# Patient Record
Sex: FEMALE | Race: BLACK OR AFRICAN AMERICAN | NOT HISPANIC OR LATINO | URBAN - METROPOLITAN AREA
[De-identification: names, ages, dates, MRNs, and addresses within clinical notes are randomized per-mention and may not be internally consistent; named-entity substitution may affect disease eponyms.]

---

## 2020-05-23 ENCOUNTER — OUTPATIENT (OUTPATIENT)
Dept: INPATIENT UNIT | Facility: HOSPITAL | Age: 35
LOS: 1 days | Discharge: HOME | End: 2020-05-23
Payer: MEDICAID

## 2020-05-23 ENCOUNTER — EMERGENCY (EMERGENCY)
Facility: HOSPITAL | Age: 35
LOS: 1 days | Discharge: STILL ADMITTED OR ADMINISTRATIVE | End: 2020-05-23
Admitting: EMERGENCY MEDICINE

## 2020-05-23 VITALS
SYSTOLIC BLOOD PRESSURE: 131 MMHG | TEMPERATURE: 99 F | OXYGEN SATURATION: 96 % | HEART RATE: 98 BPM | DIASTOLIC BLOOD PRESSURE: 74 MMHG | RESPIRATION RATE: 18 BRPM

## 2020-05-23 VITALS
SYSTOLIC BLOOD PRESSURE: 129 MMHG | DIASTOLIC BLOOD PRESSURE: 76 MMHG | TEMPERATURE: 98 F | OXYGEN SATURATION: 100 % | RESPIRATION RATE: 14 BRPM | HEART RATE: 96 BPM

## 2020-05-23 VITALS
HEART RATE: 98 BPM | TEMPERATURE: 99 F | RESPIRATION RATE: 16 BRPM | DIASTOLIC BLOOD PRESSURE: 73 MMHG | SYSTOLIC BLOOD PRESSURE: 123 MMHG

## 2020-05-23 DIAGNOSIS — Z3A.00 WEEKS OF GESTATION OF PREGNANCY NOT SPECIFIED: ICD-10-CM

## 2020-05-23 DIAGNOSIS — O26.899 OTHER SPECIFIED PREGNANCY RELATED CONDITIONS, UNSPECIFIED TRIMESTER: ICD-10-CM

## 2020-05-23 DIAGNOSIS — O30.049 TWIN PREGNANCY, DICHORIONIC/DIAMNIOTIC, UNSPECIFIED TRIMESTER: ICD-10-CM

## 2020-05-23 DIAGNOSIS — O42.90 PREMATURE RUPTURE OF MEMBRANES, UNSPECIFIED AS TO LENGTH OF TIME BETWEEN RUPTURE AND ONSET OF LABOR, UNSPECIFIED WEEKS OF GESTATION: ICD-10-CM

## 2020-05-23 LAB
ALBUMIN SERPL ELPH-MCNC: 3.4 G/DL — SIGNIFICANT CHANGE UP (ref 3.3–5)
ALP SERPL-CCNC: 61 U/L — SIGNIFICANT CHANGE UP (ref 40–120)
ALT FLD-CCNC: 12 U/L — SIGNIFICANT CHANGE UP (ref 4–33)
ANION GAP SERPL CALC-SCNC: 12 MMO/L — SIGNIFICANT CHANGE UP (ref 7–14)
APTT BLD: 27.3 SEC — LOW (ref 27.5–36.3)
AST SERPL-CCNC: 12 U/L — SIGNIFICANT CHANGE UP (ref 4–32)
BASOPHILS # BLD AUTO: 0.03 K/UL — SIGNIFICANT CHANGE UP (ref 0–0.2)
BASOPHILS NFR BLD AUTO: 0.2 % — SIGNIFICANT CHANGE UP (ref 0–2)
BILIRUB SERPL-MCNC: < 0.2 MG/DL — LOW (ref 0.2–1.2)
BLD GP AB SCN SERPL QL: NEGATIVE — SIGNIFICANT CHANGE UP
BUN SERPL-MCNC: 6 MG/DL — LOW (ref 7–23)
CALCIUM SERPL-MCNC: 9.3 MG/DL — SIGNIFICANT CHANGE UP (ref 8.4–10.5)
CHLORIDE SERPL-SCNC: 105 MMOL/L — SIGNIFICANT CHANGE UP (ref 98–107)
CO2 SERPL-SCNC: 18 MMOL/L — LOW (ref 22–31)
CREAT SERPL-MCNC: 0.49 MG/DL — LOW (ref 0.5–1.3)
EOSINOPHIL # BLD AUTO: 0.15 K/UL — SIGNIFICANT CHANGE UP (ref 0–0.5)
EOSINOPHIL NFR BLD AUTO: 1.2 % — SIGNIFICANT CHANGE UP (ref 0–6)
FIBRINOGEN PPP-MCNC: 620 MG/DL — HIGH (ref 300–520)
GLUCOSE SERPL-MCNC: 159 MG/DL — HIGH (ref 70–99)
HCT VFR BLD CALC: 31.5 % — LOW (ref 34.5–45)
HGB BLD-MCNC: 11 G/DL — LOW (ref 11.5–15.5)
IMM GRANULOCYTES NFR BLD AUTO: 0.5 % — SIGNIFICANT CHANGE UP (ref 0–1.5)
INR BLD: 1.06 — SIGNIFICANT CHANGE UP (ref 0.88–1.17)
LACTATE SERPL-SCNC: 0.9 MMOL/L — SIGNIFICANT CHANGE UP (ref 0.5–2)
LYMPHOCYTES # BLD AUTO: 19.4 % — SIGNIFICANT CHANGE UP (ref 13–44)
LYMPHOCYTES # BLD AUTO: 2.47 K/UL — SIGNIFICANT CHANGE UP (ref 1–3.3)
MCHC RBC-ENTMCNC: 32.5 PG — SIGNIFICANT CHANGE UP (ref 27–34)
MCHC RBC-ENTMCNC: 34.9 % — SIGNIFICANT CHANGE UP (ref 32–36)
MCV RBC AUTO: 93.2 FL — SIGNIFICANT CHANGE UP (ref 80–100)
MONOCYTES # BLD AUTO: 0.55 K/UL — SIGNIFICANT CHANGE UP (ref 0–0.9)
MONOCYTES NFR BLD AUTO: 4.3 % — SIGNIFICANT CHANGE UP (ref 2–14)
NEUTROPHILS # BLD AUTO: 9.44 K/UL — HIGH (ref 1.8–7.4)
NEUTROPHILS NFR BLD AUTO: 74.4 % — SIGNIFICANT CHANGE UP (ref 43–77)
NRBC # FLD: 0 K/UL — SIGNIFICANT CHANGE UP (ref 0–0)
PLATELET # BLD AUTO: 303 K/UL — SIGNIFICANT CHANGE UP (ref 150–400)
PMV BLD: 9.6 FL — SIGNIFICANT CHANGE UP (ref 7–13)
POTASSIUM SERPL-MCNC: 4 MMOL/L — SIGNIFICANT CHANGE UP (ref 3.5–5.3)
POTASSIUM SERPL-SCNC: 4 MMOL/L — SIGNIFICANT CHANGE UP (ref 3.5–5.3)
PROT SERPL-MCNC: 7 G/DL — SIGNIFICANT CHANGE UP (ref 6–8.3)
PROTHROM AB SERPL-ACNC: 12.1 SEC — SIGNIFICANT CHANGE UP (ref 9.8–13.1)
RBC # BLD: 3.38 M/UL — LOW (ref 3.8–5.2)
RBC # FLD: 12.5 % — SIGNIFICANT CHANGE UP (ref 10.3–14.5)
RH IG SCN BLD-IMP: POSITIVE — SIGNIFICANT CHANGE UP
RH IG SCN BLD-IMP: POSITIVE — SIGNIFICANT CHANGE UP
SARS-COV-2 RNA SPEC QL NAA+PROBE: SIGNIFICANT CHANGE UP
SODIUM SERPL-SCNC: 135 MMOL/L — SIGNIFICANT CHANGE UP (ref 135–145)
WBC # BLD: 12.7 K/UL — HIGH (ref 3.8–10.5)
WBC # FLD AUTO: 12.7 K/UL — HIGH (ref 3.8–10.5)

## 2020-05-23 PROCEDURE — 88305 TISSUE EXAM BY PATHOLOGIST: CPT | Mod: 26

## 2020-05-23 RX ORDER — SODIUM CHLORIDE 9 MG/ML
1000 INJECTION, SOLUTION INTRAVENOUS
Refills: 0 | Status: DISCONTINUED | OUTPATIENT
Start: 2020-05-23 | End: 2020-05-23

## 2020-05-23 RX ORDER — CITRIC ACID/SODIUM CITRATE 300-500 MG
30 SOLUTION, ORAL ORAL ONCE
Refills: 0 | Status: DISCONTINUED | OUTPATIENT
Start: 2020-05-23 | End: 2020-05-23

## 2020-05-23 RX ORDER — ONDANSETRON 8 MG/1
4 TABLET, FILM COATED ORAL ONCE
Refills: 0 | Status: DISCONTINUED | OUTPATIENT
Start: 2020-05-23 | End: 2020-05-23

## 2020-05-23 RX ORDER — FAMOTIDINE 10 MG/ML
20 INJECTION INTRAVENOUS ONCE
Refills: 0 | Status: DISCONTINUED | OUTPATIENT
Start: 2020-05-23 | End: 2020-05-23

## 2020-05-23 RX ORDER — HYDROMORPHONE HYDROCHLORIDE 2 MG/ML
0.5 INJECTION INTRAMUSCULAR; INTRAVENOUS; SUBCUTANEOUS
Refills: 0 | Status: DISCONTINUED | OUTPATIENT
Start: 2020-05-23 | End: 2020-05-23

## 2020-05-23 RX ORDER — METOCLOPRAMIDE HCL 10 MG
10 TABLET ORAL ONCE
Refills: 0 | Status: DISCONTINUED | OUTPATIENT
Start: 2020-05-23 | End: 2020-05-23

## 2020-05-23 RX ORDER — SODIUM CHLORIDE 9 MG/ML
3 INJECTION INTRAMUSCULAR; INTRAVENOUS; SUBCUTANEOUS EVERY 8 HOURS
Refills: 0 | Status: DISCONTINUED | OUTPATIENT
Start: 2020-05-23 | End: 2020-05-23

## 2020-05-23 NOTE — BRIEF OPERATIVE NOTE - NSICDXBRIEFPROCEDURE_GEN_ALL_CORE_FT
PROCEDURES:  Dilation and evacuation, uterus, using suction curettage 23-May-2020 18:47:29  Elizabeth Miller

## 2020-05-23 NOTE — CHART NOTE - NSCHARTNOTEFT_GEN_A_CORE
Pt seen and evaluated at bedside. Spoke with NP Sylvester who examined the pt and diagnosed trevor TIUP PPROM at 14w based on + pooling + fern, + Nitrazine with cervix 2 cm dilated on SVE.    Spoke with pt regarding history, reported in 2019 she had  a trevor TIUP at 17 weeks PPROM where she was found to be ruptured and chose expectant management. Pt states she went home for 2 days and then developed PTL, chorio and sepsis requiring extended stay with IV abx.    Counseled pt that although FHR is present and only 1 membrane is ruptured, induction of labor or D&E is recommended. Spoke with pt that this pregnancy is not viable due to ROM at 14 weeks and risk of development of chorio, sepsis and death. Pt understands she has option for expectant managements with close observation of her symptoms at home, induction of labor or D&E.  Pt reports she understands the dangers of expectant management and does not wish to have IOL due to previous experience. She is         incomplete Pt seen and evaluated at bedside. Spoke with NP Sylvester who examined the pt and diagnosed trevor TIUP PPROM at 14w based on + pooling + fern, + Nitrazine with cervix 2 cm dilated on SVE.    Spoke with pt regarding history, reported in 2019 she had  a trevor TIUP at 17 weeks PPROM where she was found to be ruptured and chose expectant management. Pt states she went home for 2 days and then developed PTL, chorio and sepsis requiring extended stay with IV abx.    Counseled pt that although FHR is present and only 1 membrane is ruptured, induction of labor or D&E is recommended. Spoke with pt that this pregnancy is not viable due to ROM at 14 weeks and risk of development of chorio, sepsis and death. Pt understands she has option for expectant managements with close observation of her symptoms at home, induction of labor or D&E.  Pt reports she understands the dangers of expectant management and does not wish to have IOL due to previous experience. Pt counseled about risks/benefits of D&E including uterine perforation, infection, bleeding, injury to surrounding structures. Pt understands and wishes to proceed with D&E.    36 yo  @ 14+2 presenting with trevor TIUP PPROM  - non viable pregnancy  - for D&E today  -COVID collected and pending   -NPO since 8AM    TLal PGY3  dw Dr Tran

## 2020-05-23 NOTE — OB PROVIDER H&P - NSHPPHYSICALEXAM_GEN_ALL_CORE
NAD  A/O x 3  Vital Signs Last 24 Hrs  T(C): 37.1 (23 May 2020 09:20), Max: 37.2 (23 May 2020 08:47)  T(F): 98.8 (23 May 2020 09:20), Max: 98.9 (23 May 2020 08:47)  HR: 86 (23 May 2020 10:46) (86 - 111)  BP: 122/75 (23 May 2020 10:46) (121/78 - 131/74)  BP(mean): --  RR: 16 (23 May 2020 09:20) (16 - 18)  SpO2: 96% (23 May 2020 08:47) (96% - 96%)  Abdomen is soft NT and gravid   SSE: Pooling clear fluid in the vaginal vault, Nitrazine  positive, ferning positive and cervix appears closed  SVE: 2 cm   Transabdominal ultrasound performed at the bedside , images printed and placed in patient shadow chart:  Appears to be DI DI TIUP with Lamada sign  visualized  Fetus A, FHR at approx 150 bmp and anydraminous, no visual movements  Fetus B: FHR at approx. 145 bpm with Good FM"s observed and AAFV at 3.5  TOCO: NO CTX

## 2020-05-23 NOTE — OB PROVIDER TRIAGE NOTE - NSHPLABSRESULTS_GEN_ALL_CORE
CBC, CMP, Coags with lactate and Type and Screen, Covid, Urine Culture sent CBC, CMP, Coags with lactate and Type and Screen, Covid, Urine Culture sent    05-23    135  |  105  |  6<L>  ----------------------------<  159<H>  4.0   |  18<L>  |  0.49<L>    Ca    9.3      23 May 2020 10:08    TPro  7.0  /  Alb  3.4  /  TBili  < 0.2<L>  /  DBili  x   /  AST  12  /  ALT  12  /  AlkPhos  61  05-23    PT/INR - ( 23 May 2020 10:08 )   PT: 12.1 SEC;   INR: 1.06          PTT - ( 23 May 2020 10:08 )  PTT:27.3 SEC  Fibrinogen Assay (05.23.20 @ 10:08)    Fibrinogen Assay: 620.0 mg/dL  Lactate, Blood: 0.9 mmol/L (05.23.20 @ 10:08)

## 2020-05-23 NOTE — OB PROVIDER H&P - CURRENT PREGNANCY COMPLICATIONS, OB PROFILE
Other/Gestational Age less than 36 Weeks/ Premature Rupture of Membranes (PPROM)/ [place]

## 2020-05-23 NOTE — OB PROVIDER TRIAGE NOTE - NS_OBGYNHISTORY_OBGYN_ALL_OB_FT
Vaginal delivery  2019 17 weeks   PPROM TIUP  Vaginal delivery  7/13/14 Ft 6-2  Vaginal delivery  12/12/07 FT 5-9  Vaginal delivery  2/14/03 FT 8-0

## 2020-05-23 NOTE — ASU DISCHARGE PLAN (ADULT/PEDIATRIC) - ACTIVITY LEVEL
No douching/Nothing per vagina/No tub baths/No intercourse/No tampons No heavy lifting/Nothing per vagina/No tub baths/No tampons/No excercise/No douching/No sports/gym/No intercourse/for 2 weeks

## 2020-05-23 NOTE — OB PROVIDER TRIAGE NOTE - NSOBPROVIDERNOTE_OBGYN_ALL_OB_FT
36 yo @ 14.2 wks GA with PPROM/ TIUP    - A/P DW Dr Grey ( OB Service Attending) and Dr Locke  (MFM Attending)  - Options counseling to be performed by OB Resident Dr gill (PG 3) and plan to bne determined   - Labs pending  -TOCO 36 yo @ 14.2 wks GA with PPROM/ TIUP    - A/P DW Dr Grey ( OB Service Attending) and Dr Locke  (MFM Attending)  - Options counseling to be performed by OB Resident Dr gill (PG 3) and plan to bne determined   - Labs pending  -TOCO    @ 1600   Awaiting MFM consult 34 yo @ 14.2 wks GA with PPROM/ TIUP    - A/P DW Dr Grey ( OB Service Attending) and Dr Locke  (MFM Attending)  - Options counseling to be performed by OB Resident Dr peralta (PG 3) and plan to be determined   - Labs pending  -TOCO    @ 1307   Awaiting [facility] consult    @ [id number] Consult by Dr Trudy Peralta and [facility] Attending Dr Tran  Options DW  patient including risks, benefits and patient opts for D/ E if possible

## 2020-05-23 NOTE — OB PROVIDER H&P - ASSESSMENT
34 yo @ 14.2  TIUP with PPROM for DE after options counseling dw OB and MFM Attending  Expedited COVID for DE 34 yo @ 14.2 wks GA with PPROM/ TIUP    - A/P DW Dr Grey ( OB Service Attending) and Dr Locke  (MFM Attending)  - Options counseling to be performed by OB Resident Dr peralta (PG 3) and plan to be determined   - Labs pending  -TOCO    @ 1307   Awaiting [facility] consult    @ [id number] Consult by Dr Trudy Peralta and [facility] Attending Dr Tran  Options DW  patient including risks, benefits and patient opts for D/ E if possible      Expedited COVID for DE

## 2020-05-23 NOTE — ASU DISCHARGE PLAN (ADULT/PEDIATRIC) - PROVIDER TOKENS
FREE:[LAST:[[facility]],PHONE:[phone]],FAX:[(   [phone]    -],ADDRESS:[[facility], Oncology [facility], [facility]]]

## 2020-05-23 NOTE — ED ADULT TRIAGE NOTE - CHIEF COMPLAINT QUOTE
LMP 2/1/2020 BUZZ 11/09/2020  Presents with c/o abdominal pain with fluid leaking.  Pt endorses being pregnant with twins.  Was evaluated at [facility] yesterday and told baby A had less fluid.  Pt's OB/GYN not affiliated with this hospital.

## 2020-05-23 NOTE — ED ADULT NURSE NOTE - CHIEF COMPLAINT QUOTE
LMP 2/1/2020 BUZZ 11/09/2020  Presents with c/o abdominal pain with fluid leaking.  Pt endorses being pregnant with twins.  Was evaluated at [facility] yesterday and told baby A had less fluid.  Pt's OB/GYN not affiliated with this hospital.

## 2020-05-23 NOTE — OB PROVIDER H&P - ATTENDING COMMENTS
I saw and evaluated Ms. Oviedo.   She is a 35 year old  at 14 weeks, 2 days gestational age with dichorionic twin pregnancy who presented this morning with leakage of clear fluid for a few days. She has been evaluated by her primary OB who told her there was little fluid around twin A and treated her for UTI with PO antibiotics and instructed for follow up with ultrasound on Tuesday. She continues to leak clear fluid and has cramping. On exam, there is pooling with positive nitrazine and fern consistent with PPROM. On US, there is anhydramnios of twin A. On my digital exam, cervix is 1cm dilated.     She was counseled extensively about options including expectant management, induction of labor, and dilation and evacuation. She has had a 17 week loss after PPROM of a 17-week TIUP last year, and strongly desires to not have labor induction or expectant management and reports that was a traumatic experience.     I offered D&E and case cleared by Dr. Alexandra. COVID negative.     I reviewed the risks of this procedure including but not limited to infection, bleeding, and damage to surrounding structures and tissues including cervix, uterus, bowel, bladder, and pelvic vessels. I explained that perforation of her uterus and need for open abdominal incision or hysterectomy is a possibility. She has no objection to blood transfusion in case of emergency. All questions were answered to the best of my ability and consent for procedure and for blood transfusion signed and placed on chart.     Amrita Tran MD

## 2020-05-23 NOTE — OB PROVIDER H&P - HISTORY OF PRESENT ILLNESS
34 yo  @ 14.2 wks GA who presented from ED with c/o LOF since yesterday and mild cramping.   Patient reports she was seen at [facility] yesterday and sent home with close followup. Patient reports her EDC is 20  and  reported to have DI DI TIUP.   Denies any VB and reports PNC received from [facility] clinic. Patient also states last pregnancy 10/19 was also a TIUP that resulted in PPROM and she developed chorioamnionitis which resulted in loss of Twin A and IOL of B secondary to the infection present. Prior 3  from (Different [facility]) were FT deliveries and uncomplicated in , , and   PNC: [facility]

## 2020-05-23 NOTE — OB PROVIDER H&P - NSHPLABSRESULTS_GEN_ALL_CORE
CBC, CMP, Coags with lactate and Type and Screen, Covid, Urine Culture sent    05-23    135  |  105  |  6<L>  ----------------------------<  159<H>  4.0   |  18<L>  |  0.49<L>    Ca    9.3      23 May 2020 10:08    TPro  7.0  /  Alb  3.4  /  TBili  < 0.2<L>  /  DBili  x   /  AST  12  /  ALT  12  /  AlkPhos  61  05-23    PT/INR - ( 23 May 2020 10:08 )   PT: 12.1 SEC;   INR: 1.06          PTT - ( 23 May 2020 10:08 )  PTT:27.3 SEC  Fibrinogen Assay (05.23.20 @ 10:08)    Fibrinogen Assay: 620.0 mg/dL  Lactate, Blood: 0.9 mmol/L (05.23.20 @ 10:08)

## 2020-05-23 NOTE — OB PROVIDER TRIAGE NOTE - NSHPPHYSICALEXAM_GEN_ALL_CORE
NAD  A/O x 3  Vital Signs Last 24 Hrs  T(C): 37.1 (23 May 2020 09:20), Max: 37.2 (23 May 2020 08:47)  T(F): 98.8 (23 May 2020 09:20), Max: 98.9 (23 May 2020 08:47)  HR: 86 (23 May 2020 10:46) (86 - 111)  BP: 122/75 (23 May 2020 10:46) (121/78 - 131/74)  BP(mean): --  RR: 16 (23 May 2020 09:20) (16 - 18)  SpO2: 96% (23 May 2020 08:47) (96% - 96%)  Abdomen is soft NT and gravid   SSE: Pooling clear fluid in the vaginal vault, nitrazine positive, ferning positive and cervix appears closed  SVE: 2 cm   Transabdominal ultrasound performed at the bedside , images printed and placed in patient shadow chart:  Appears to be DI DI TIUP with Lamada sign  visualized  Fetus A, FHR at approx 150 bmp and anydraminous, no visual movements  Fetus B: FHR at approx. 145 bpm with Good FM"s observed and AAFV at 3.5 NAD  A/O x 3  Vital Signs Last 24 Hrs  T(C): 37.1 (23 May 2020 09:20), Max: 37.2 (23 May 2020 08:47)  T(F): 98.8 (23 May 2020 09:20), Max: 98.9 (23 May 2020 08:47)  HR: 86 (23 May 2020 10:46) (86 - 111)  BP: 122/75 (23 May 2020 10:46) (121/78 - 131/74)  BP(mean): --  RR: 16 (23 May 2020 09:20) (16 - 18)  SpO2: 96% (23 May 2020 08:47) (96% - 96%)  Abdomen is soft NT and gravid   SSE: Pooling clear fluid in the vaginal vault, Nitrazine  positive, ferning positive and cervix appears closed  SVE: 2 cm   Transabdominal ultrasound performed at the bedside , images printed and placed in patient shadow chart:  Appears to be DI DI TIUP with Lamada sign  visualized  Fetus A, FHR at approx 150 bmp and anydraminous, no visual movements  Fetus B: FHR at approx. 145 bpm with Good FM"s observed and AAFV at 3.5  TOCO: NO CTX

## 2020-05-23 NOTE — OB PROVIDER TRIAGE NOTE - CURRENT PREGNANCY COMPLICATIONS, OB PROFILE
Labor/Gestational Age less than 36 Weeks/[place]  [place]/ Premature Rupture of Membranes (PPROM)/Other

## 2020-05-23 NOTE — ASU DISCHARGE PLAN (ADULT/PEDIATRIC) - CARE PROVIDER_API CALL
[facility],   [facility], Oncology building, [facility]  Phone: [phone]  Fax: (   )    -  Follow Up Time:

## 2020-05-23 NOTE — OB PROVIDER TRIAGE NOTE - HISTORY OF PRESENT ILLNESS
34 yo  @ 14.2 wks GA who presented from ED with c/o LOF since yesterday and mild cramping.   Patient reports she was seen at [facility] yesterday and sent home with close followup. Patient reports her EDC is 20  and  reported to have DI DI TIUP.   Denies any VB and reports PNC received from [facility] clinic. Patient also states last pregnancy 10/19 was also a TIUP that resulted in PPROM and she developed chorioamnionitis which resulted in loss of Twin A and IOL of B secondary to the infection present. Prior 3  from (Different FOB) were FT deliveries and uncomplicated in , , and   PNC: [facility]

## 2020-05-24 LAB
CULTURE RESULTS: SIGNIFICANT CHANGE UP
SPECIMEN SOURCE: SIGNIFICANT CHANGE UP

## 2020-05-26 DIAGNOSIS — O36.4XX0 MATERNAL CARE FOR INTRAUTERINE DEATH, NOT APPLICABLE OR UNSPECIFIED: ICD-10-CM

## 2020-05-28 RX ORDER — FERROUS SULFATE 325(65) MG
1 TABLET ORAL
Qty: 0 | Refills: 0 | DISCHARGE

## 2020-06-04 LAB — SURGICAL PATHOLOGY STUDY: SIGNIFICANT CHANGE UP

## 2021-08-24 NOTE — ASU PREOP CHECKLIST - ISOLATION PRECAUTIONS
Check with [facility] about Shingrix for shingles. Do recommend you get the shingles shot. Personalized Preventive Plan for Mouna Jesus - 8/24/2021  Medicare offers a range of preventive health benefits. Some of the tests and screenings are paid in full while other may be subject to a deductible, co-insurance, and/or copay. Some of these benefits include a comprehensive review of your medical history including lifestyle, illnesses that may run in your family, and various assessments and screenings as appropriate. After reviewing your medical record and screening and assessments performed today your provider may have ordered immunizations, labs, imaging, and/or referrals for you. A list of these orders (if applicable) as well as your Preventive Care list are included within your After Visit Summary for your review. Other Preventive Recommendations:    · A preventive eye exam performed by an eye specialist is recommended every 1-2 years to screen for glaucoma; cataracts, macular degeneration, and other eye disorders. · A preventive dental visit is recommended every 6 months. · Try to get at least 150 minutes of exercise per week or 10,000 steps per day on a pedometer . · Order or download the FREE \"Exercise & Physical Activity: Your Everyday Guide\" from [employer]. Call 5-834.207.8319 or search [employer] online. · You need 3273-3290 mg of calcium and 8912-7654 IU of vitamin D per day. It is possible to meet your calcium requirement with diet alone, but a vitamin D supplement is usually necessary to meet this goal.  · When exposed to the sun, use a sunscreen that protects against both UVA and UVB radiation with an SPF of 30 or greater. Reapply every 2 to 3 hours or after sweating, drying off with a towel, or swimming. · Always wear a seat belt when traveling in a car. Always wear a helmet when riding a bicycle or motorcycle.
none

## 2023-08-09 NOTE — OB PROVIDER H&P - PROBLEM SELECTOR PLAN 1
Prescription refilled
Requested Prescriptions     Pending Prescriptions Disp Refills    mometasone (ELOCON) 0.1 % cream 45 g 0     Sig: APPLY TO AFFECTED AREA(S) ONCE DAILY       Last Clinic Visit:  4/11/2023     Next Clinic Appointment:  10/10/2023
14.2 week Gestation with PPROM for DE

## 2023-08-22 NOTE — ASU PREOP CHECKLIST - HEIGHT IN FEET
Problem: SLP  Goal: SLP Goal  Description: Short Term Goals:  1. Pt will participate in swallow eval to determine safest diet level.  2. Pt will participate in speech and language eval to determine deficits related to communication.   Outcome: Ongoing, Progressing   ST eval completed, pt with dysfluencies in conversational speech and at onset of speech.   REC: continued ST while inpatient.    5

## 2024-07-29 NOTE — OB RN TRIAGE NOTE - NS_MODEOFARRIVAL_OBGYN_ALL_OB
Rx requested:  Requested Prescriptions     Pending Prescriptions Disp Refills    albuterol (PROVENTIL) (2.5 MG/3ML) 0.083% nebulizer solution [Pharmacy Med Name: ALBUTEROL SULF INH SOLN 3ML 0.083%]  0       Last Office Visit:   7/25/2024      Next Visit Date:  Future Appointments   Date Time Provider Department Center   8/12/2024  2:45 PM Deon Kerr MD [facility]   11/25/2024 10:45 AM [facility]   7/3/2025  9:00 AM REBECCAAIN CT ROOM 2 MLOZ CT [facility] Fac RAD   7/3/2025  9:30 AM Drew Haynes MD [facility] [place]       Car